# Patient Record
Sex: FEMALE | Race: BLACK OR AFRICAN AMERICAN | NOT HISPANIC OR LATINO | Employment: UNEMPLOYED | URBAN - METROPOLITAN AREA
[De-identification: names, ages, dates, MRNs, and addresses within clinical notes are randomized per-mention and may not be internally consistent; named-entity substitution may affect disease eponyms.]

---

## 2022-01-12 ENCOUNTER — PATIENT MESSAGE (OUTPATIENT)
Dept: ADMINISTRATIVE | Facility: OTHER | Age: 47
End: 2022-01-12

## 2022-01-13 ENCOUNTER — PATIENT MESSAGE (OUTPATIENT)
Dept: ADMINISTRATIVE | Facility: OTHER | Age: 47
End: 2022-01-13

## 2022-01-14 ENCOUNTER — PATIENT MESSAGE (OUTPATIENT)
Dept: ADMINISTRATIVE | Facility: OTHER | Age: 47
End: 2022-01-14

## 2023-03-08 PROBLEM — J43.9 PULMONARY EMPHYSEMA: Status: ACTIVE | Noted: 2023-03-08

## 2024-01-17 ENCOUNTER — ON-DEMAND VIRTUAL (OUTPATIENT)
Dept: URGENT CARE | Facility: CLINIC | Age: 49
End: 2024-01-17
Payer: COMMERCIAL

## 2024-01-17 DIAGNOSIS — B96.89 BACTERIAL UPPER RESPIRATORY INFECTION: Primary | ICD-10-CM

## 2024-01-17 DIAGNOSIS — J06.9 BACTERIAL UPPER RESPIRATORY INFECTION: Primary | ICD-10-CM

## 2024-01-17 PROCEDURE — 99203 OFFICE O/P NEW LOW 30 MIN: CPT | Mod: 95,,,

## 2024-01-17 RX ORDER — METHYLPREDNISOLONE 4 MG/1
TABLET ORAL
Qty: 21 EACH | Refills: 0 | Status: SHIPPED | OUTPATIENT
Start: 2024-01-17 | End: 2024-02-07

## 2024-01-17 RX ORDER — AZITHROMYCIN 250 MG/1
TABLET, FILM COATED ORAL
Qty: 6 TABLET | Refills: 0 | Status: SHIPPED | OUTPATIENT
Start: 2024-01-17 | End: 2024-01-22

## 2024-01-17 NOTE — PROGRESS NOTES
Subjective:      Patient ID: Madhuri Trejo is a 48 y.o. female.    Vitals:  vitals were not taken for this visit.     Chief Complaint: Sinus Problem      Visit Type: TELE AUDIOVISUAL    Present with the patient at the time of consultation: TELEMED PRESENT WITH PATIENT: None    Past Medical History:   Diagnosis Date    Anxiety disorder, unspecified     Emphysema, unspecified     G6PD deficiency     Migraines     Narcolepsy     PCOS (polycystic ovarian syndrome)     Sickle cell trait      Past Surgical History:   Procedure Laterality Date    THERMAL ABLATION OF ENDOMETRIUM      TONSILLECTOMY      TUBAL LIGATION       Review of patient's allergies indicates:   Allergen Reactions    Latex, natural rubber Blisters, Hives, Itching and Swelling    Aspirin     Codeine     Penicillins     Sulfa (sulfonamide antibiotics)      Current Outpatient Medications on File Prior to Visit   Medication Sig Dispense Refill    albuterol (ACCUNEB) 1.25 mg/3 mL Nebu Take 3 mLs (1.25 mg total) by nebulization every 6 (six) hours as needed (wheezing or SOB). Rescue 75 mL 12    albuterol (PROVENTIL HFA) 90 mcg/actuation inhaler Inhale 2 puffs into the lungs every 6 (six) hours as needed for Wheezing. Rescue 18 g 12    albuterol (PROVENTIL HFA) 90 mcg/actuation inhaler Inhale 2 puffs into the lungs every 6 (six) hours as needed for Wheezing. Rescue 18 g 12    baclofen (LIORESAL) 10 MG tablet Take by mouth.      benzonatate (TESSALON) 100 MG capsule Take 100 mg by mouth.      chlorhexidine (PERIDEX) 0.12 % solution SMARTSIG:Milliliter(s) By Mouth      DIALYVITE VITAMIN D3 MAX 1,250 mcg (50,000 unit) Tab Take 1 tablet by mouth every 30 days.      ergocalciferol (ERGOCALCIFEROL) 50,000 unit Cap       fluticasone propionate (FLOVENT HFA) 220 mcg/actuation inhaler Inhale 1 puff into the lungs 2 (two) times daily. Controller 12 g 12    hyoscyamine (LEVBID) 0.375 mg Tb12 Take 0.375 mg by mouth every 12 (twelve) hours.      ibuprofen (ADVIL,MOTRIN) 600  MG tablet Take 600 mg by mouth every 6 (six) hours.      levocetirizine (XYZAL) 5 MG tablet Take 5 mg by mouth.      levocetirizine (XYZAL) 5 MG tablet Take 1 tablet (5 mg total) by mouth every evening. 30 tablet 11    meloxicam (MOBIC) 7.5 MG tablet Take 7.5 mg by mouth 2 (two) times daily.      montelukast (SINGULAIR) 10 mg tablet Take by mouth.      propranoloL (INDERAL) 10 MG tablet Take 10 mg by mouth.      SAVELLA 50 mg Tab Take 50 mg by mouth 2 (two) times daily.      tiotropium-olodateroL (STIOLTO RESPIMAT) 2.5-2.5 mcg/actuation Mist Inhale 2 puffs into the lungs once daily. Controller 2.5 g 12    tiZANidine (ZANAFLEX) 4 MG tablet TAKE 1 TO 2 TABLETS BY MOUTH EVERY 8 HOURS AS NEEDED . DO NOT EXCEED 3 DOSES PER 24 HOURS.      topiramate (TROKENDI XR) 100 mg Cp24 Take 100 mg by mouth 2 (two) times a day.      TRULANCE 3 mg Tab Take 1 tablet by mouth.      zolpidem (AMBIEN) 5 MG Tab Take by mouth.       No current facility-administered medications on file prior to visit.     Family History   Problem Relation Age of Onset    Hypertension Mother     Heart disease Mother     Thalassemia Mother     Heart disease Father     Hypertension Father     Diabetes Father     Cancer Father     Sickle cell trait Father     Glucose-6-phos deficiency Father     Emphysema Father     Kidney disease Father     Arthritis Father        Medications Ordered                [facility] [address] [place] [address] [address]   [address] [facility] [address]    Telephone: [phone]   Fax: [phone]   Hours: Not open 24 hours                         E-Prescribed (2 of 2)              azithromycin (Z-MELLY) 250 MG tablet    Sig: Take 2 tablets by mouth on day 1; Take 1 tablet by mouth on days 2-5       Start: 1/17/24     Quantity: 6 tablet Refills: 0                         methylPREDNISolone (MEDROL DOSEPACK) 4 mg tablet    Sig: use as directed       Start: 1/17/24     Quantity: 21 each Refills: 0                            Ohs Peq Odvv Intake    1/17/2024  2:39 PM CST - Filed by Patient   What is your current physical address in the event of a medical emergency? [address] [facility] [address]   Are you able to take your vital signs? No   Please attach any relevant images or files          Patient states that about 2 days ago she began to have nasal congestion, ear pressure, sneezing and nasal drainage. Patient states that she is not having any other symptoms patient states that she took a covid test and it was negative. Patient states that her nasal drainage is yellow/green in color. Patient denies any chest pain or sob at this time     Sinus Problem  Associated symptoms include congestion and sneezing.       Constitution: Negative.   HENT:  Positive for congestion and postnasal drip.    Neck: neck negative.   Cardiovascular: Negative.    Eyes: Negative.    Respiratory: Negative.     Gastrointestinal: Negative.    Endocrine: negative.   Genitourinary: Negative.    Musculoskeletal: Negative.    Skin: Negative.    Allergic/Immunologic: Positive for sneezing.   Neurological: Negative.    Hematologic/Lymphatic: Negative.    Psychiatric/Behavioral: Negative.          Objective:   The physical exam was conducted virtually.  Physical Exam   Constitutional: She is oriented to person, place, and time.   HENT:   Head: Normocephalic and atraumatic.   Nose: Congestion present.   Eyes: Conjunctivae are normal. Pupils are equal, round, and reactive to light. Extraocular movement intact   Neck: Neck supple.   Pulmonary/Chest: Effort normal.   Abdominal: Normal appearance.   Musculoskeletal: Normal range of motion.         General: Normal range of motion.   Neurological: no focal deficit. She is alert, oriented to person, place, and time and at baseline.   Skin: Skin is warm.   Psychiatric: Her behavior is normal. Mood, judgment and thought content normal.       Assessment:     1. Bacterial upper respiratory infection        Plan:        Bacterial upper respiratory infection  -     azithromycin (Z-MELLY) 250 MG tablet; Take 2 tablets by mouth on day 1; Take 1 tablet by mouth on days 2-5  Dispense: 6 tablet; Refill: 0  -     methylPREDNISolone (MEDROL DOSEPACK) 4 mg tablet; use as directed  Dispense: 21 each; Refill: 0

## 2024-04-15 PROBLEM — J45.50 SEVERE PERSISTENT ASTHMA: Status: ACTIVE | Noted: 2024-04-15

## 2024-04-22 ENCOUNTER — PATIENT MESSAGE (OUTPATIENT)
Dept: ADMINISTRATIVE | Facility: OTHER | Age: 49
End: 2024-04-22
Payer: COMMERCIAL

## 2024-11-24 ENCOUNTER — PATIENT MESSAGE (OUTPATIENT)
Dept: ADMINISTRATIVE | Facility: OTHER | Age: 49
End: 2024-11-24
Payer: MEDICAID

## 2025-01-13 ENCOUNTER — PATIENT MESSAGE (OUTPATIENT)
Dept: ADMINISTRATIVE | Facility: OTHER | Age: 50
End: 2025-01-13
Payer: MEDICAID

## 2025-03-06 ENCOUNTER — PATIENT MESSAGE (OUTPATIENT)
Dept: ADMINISTRATIVE | Facility: OTHER | Age: 50
End: 2025-03-06
Payer: MEDICAID

## 2025-04-13 ENCOUNTER — ON-DEMAND VIRTUAL (OUTPATIENT)
Dept: URGENT CARE | Facility: CLINIC | Age: 50
End: 2025-04-13
Payer: MEDICAID

## 2025-04-13 DIAGNOSIS — J01.90 ACUTE SINUSITIS, RECURRENCE NOT SPECIFIED, UNSPECIFIED LOCATION: Primary | ICD-10-CM

## 2025-04-13 DIAGNOSIS — H92.03 OTALGIA OF BOTH EARS: ICD-10-CM

## 2025-04-13 PROCEDURE — 98005 SYNCH AUDIO-VIDEO EST LOW 20: CPT | Mod: 95,,, | Performed by: NURSE PRACTITIONER

## 2025-04-13 RX ORDER — AZITHROMYCIN 250 MG/1
TABLET, FILM COATED ORAL
Qty: 6 TABLET | Refills: 0 | Status: SHIPPED | OUTPATIENT
Start: 2025-04-13 | End: 2025-04-18

## 2025-04-13 NOTE — PATIENT INSTRUCTIONS

## 2025-04-13 NOTE — PROGRESS NOTES
Subjective:      Patient ID: Madhuri Trejo is a 49 y.o. female.    Vitals:  vitals were not taken for this visit.     Chief Complaint: Sinus Problem and Otalgia      Visit Type: TELE AUDIOVISUAL    Patient Location: Home [facility] [place]     Present with the patient at the time of consultation: TELEMED PRESENT WITH PATIENT: None    Past Medical History:   Diagnosis Date    Anxiety disorder, unspecified     Emphysema, unspecified     G6PD deficiency     Migraines     Narcolepsy     PCOS (polycystic ovarian syndrome)     Pneumonia, unspecified organism     Sickle cell trait     Sinus infection      Past Surgical History:   Procedure Laterality Date    THERMAL ABLATION OF ENDOMETRIUM      TONSILLECTOMY      TUBAL LIGATION       Review of patient's allergies indicates:   Allergen Reactions    Latex, natural rubber Blisters, Hives, Itching and Swelling    Aspirin     Codeine     Penicillins     Sulfa (sulfonamide antibiotics)     Trelegy ellipta [fluticasone-umeclidin-vilanter] Other (See Comments)     Fungus caused in mouth     Medications Ordered Prior to Encounter[1]  Family History   Problem Relation Name Age of Onset    Hypertension Mother      Heart disease Mother      Thalassemia Mother      Heart disease Father      Hypertension Father      Diabetes Father      Cancer Father      Sickle cell trait Father      Glucose-6-phos deficiency Father      Emphysema Father      Kidney disease Father      Arthritis Father         Medications Ordered                [id number] [address] AT [facility] & [address], [place] [address]    Telephone: [phone]   Fax: [phone]   Hours: Not open 24 hours                         E-Prescribed (1 of 1)              azithromycin (Z-MELLY) 250 MG tablet    Sig: Take 2 tablets by mouth on day 1; Take 1 tablet by mouth on days 2-5       Start: 4/13/25     Quantity: 6 tablet Refills: 0                           Ohs  Peq Odvv Intake    4/13/2025 12:28 PM CDT - Filed by Patient   What is your current physical address in the event of a medical emergency? [address] [facility] [address]   Are you able to take your vital signs? No   Please attach any relevant images or files    Is your employer contracted with [facility]? No         Pt w/ c/o bilateral ear pain, sinus pressure/pain, ha, fever of 103.2 last night.   Chart review noted visit on yesterday, today with worsening symptoms.  Denies CP, SOB, dizziness.     Sinus Problem  This is a new problem. The current episode started 1 to 4 weeks ago. The maximum temperature recorded prior to her arrival was 103 - 104 F. Associated symptoms include chills, congestion, coughing, ear pain, headaches, sinus pressure and a sore throat. Pertinent negatives include no hoarse voice or shortness of breath.   Otalgia   Associated symptoms include coughing, headaches and a sore throat.       Constitution: Positive for chills and fever.   HENT:  Positive for ear pain, congestion, sinus pain, sinus pressure and sore throat.    Cardiovascular:  Negative for chest pain.   Respiratory:  Positive for cough. Negative for shortness of breath and wheezing.    Neurological:  Positive for headaches. Negative for dizziness.        Objective:   The physical exam was conducted virtually.  Physical Exam   Constitutional: She is oriented to person, place, and time. No distress.   HENT:   Head: Normocephalic.   Ears:   Right Ear: External ear normal.   Left Ear: External ear normal.   Nose: No rhinorrhea or congestion.   Eyes: Conjunctivae are normal.   Neck: Neck supple.   Pulmonary/Chest: Effort normal. No respiratory distress.   Neurological: She is alert and oriented to person, place, and time.   Skin: Skin is no rash.       Assessment:     1. Acute sinusitis, recurrence not specified, unspecified location    2. Otalgia of both ears        Plan:   Increase fluids and rest  Pedialyte,  powerade, gatorade  Take meds as directed  Warm salt water gargles, tea with honey, chlorseptic spray, throat lozenges   Limit foods that are salty, spicy food, limit carbonated drinks, limit acidic drinks  Tylenol or Motrin as directed for fever or body aches  Continue antihistamine (zyrtec or Claritin), Flonase and saline nasal spray  Okay to take Robitussin DM, Mucinex DM, Dayquil/Nyquil as directed  If increased Shortness of breath or difficulty breathing go to the Urgent Care or ER  If symptoms worsening or not improved f/u in Urgent Care or with PCP    Acute sinusitis, recurrence not specified, unspecified location  -     azithromycin (Z-MELLY) 250 MG tablet; Take 2 tablets by mouth on day 1; Take 1 tablet by mouth on days 2-5  Dispense: 6 tablet; Refill: 0    Otalgia of both ears  -     azithromycin (Z-MELLY) 250 MG tablet; Take 2 tablets by mouth on day 1; Take 1 tablet by mouth on days 2-5  Dispense: 6 tablet; Refill: 0      We appreciate you trusting us with your medical care. We hope you feel better soon. We will be happy to take care of you for all of your future medical needs.     You must understand that you've received Virtual treatment only and that you may be released before all your medical problems are known or treated. You, the patient, will arrange for follow up care as instructed.     Follow up with your PCP or specialty clinic as directed in the next 1-2 weeks if not improved or as needed. You can call [phone] to schedule an appointment with the appropriate provider.     If your condition worsens we recommend that you receive another evaluation in person, with your primary care provider, urgent care or at the emergency room immediately or contact your primary medical clinics after hours call service to discuss your concerns.                   [1]   Current Outpatient Medications on File Prior to Visit   Medication Sig Dispense Refill    albuterol (PROVENTIL HFA) 90 mcg/actuation inhaler  Inhale 2 puffs into the lungs every 6 (six) hours as needed for Wheezing. Rescue      albuterol-budesonide (AIRSUPRA) 90-80 mcg/actuation Inhale 2 puffs into the lungs every 6 (six) hours as needed. (Patient not taking: Reported on 4/12/2025) 10.7 g 11    amLODIPine (NORVASC) 5 MG tablet Take 5 mg by mouth.      azelastine (ASTELIN) 137 mcg (0.1 %) nasal spray 1 spray (137 mcg total) by Nasal route 2 (two) times daily. 30 mL 6    benralizumab (FASENRA PEN) 30 mg/mL AtIn Inject 30 mg into the skin every 8 weeks. for 12 doses 1 mL 11    butalbitaL-acetaminop-caf-cod -22-30 mg Cap Take 1 capsule by mouth every 4 (four) hours as needed.      butalbital-acetaminophen-caffeine -40 mg (FIORICET, ESGIC) -40 mg per tablet Take 1-2 tablets by mouth every 4 (four) hours as needed. (Patient not taking: Reported on 4/12/2025)      chlorhexidine (PERIDEX) 0.12 % solution SMARTSIG:Milliliter(s) By Mouth (Patient not taking: Reported on 4/12/2025)      cholecalciferol, vitamin D3, 1,250 mcg (50,000 unit) capsule Take by mouth.      diclofenac sodium (SOLARAZE) 3 % gel Apply 1 application  topically 2 (two) times daily.      EPINEPHrine (EPIPEN) 0.3 mg/0.3 mL AtIn Inject 0.3 mLs (0.3 mg total) into the muscle once. for 1 dose 0.3 mL 0    ergocalciferol (ERGOCALCIFEROL) 50,000 unit Cap       fluticasone propionate (FLONASE) 50 mcg/actuation nasal spray 2 sprays (100 mcg total) by Each Nostril route once daily. 18 g 11    fluticasone propionate (FLOVENT HFA) 220 mcg/actuation inhaler Inhale 1 puff into the lungs 2 (two) times daily. Controller 12 g 12    gabapentin (NEURONTIN) 100 MG capsule Take 100 mg by mouth. (Patient not taking: Reported on 4/12/2025)      hydrOXYzine HCL (ATARAX) 25 MG tablet Take 25 mg by mouth 3 (three) times daily.      hyoscyamine (LEVBID) 0.375 mg Tb12 Take 0.375 mg by mouth every 12 (twelve) hours.      ibuprofen (ADVIL,MOTRIN) 600 MG tablet Take 600 mg by mouth every 6 (six) hours.       indomethacin (INDOCIN) 50 MG capsule Take by mouth.      ipratropium (ATROVENT) 21 mcg (0.03 %) nasal spray SMARTSI Spray(s) Both Nares 2-3 Times Daily      levocetirizine (XYZAL) 5 MG tablet Take 1 tablet (5 mg total) by mouth every evening. 30 tablet 11    meloxicam (MOBIC) 7.5 MG tablet Take 7.5 mg by mouth 2 (two) times daily.      montelukast (SINGULAIR) 10 mg tablet Take 1 tablet (10 mg total) by mouth every evening. 30 tablet 11    naratriptan (AMERGE) 2.5 MG tablet Take by mouth.      NUVIGIL 250 mg tablet       phenylephrine-DM-guaiFENesin (DECONEX DMX) 10-17.5-385 mg Tab Take 1 tablet by mouth every 4 to 6 hours as needed (congestion). (Patient not taking: Reported on 2025) 30 tablet 1    promethazine (PHENERGAN) 25 MG tablet Take 25 mg by mouth every 6 (six) hours as needed.      propranoloL (INDERAL) 10 MG tablet Take 10 mg by mouth.      rosuvastatin (CRESTOR) 10 MG tablet Take 10 mg by mouth every evening.      SAVELLA 50 mg Tab Take 50 mg by mouth 2 (two) times daily.      tiotropium-olodateroL (STIOLTO RESPIMAT) 2.5-2.5 mcg/actuation Mist Inhale 2 puffs into the lungs once daily. Controller 2.5 g 12    tiZANidine (ZANAFLEX) 4 MG tablet TAKE 1 TO 2 TABLETS BY MOUTH EVERY 8 HOURS AS NEEDED . DO NOT EXCEED 3 DOSES PER 24 HOURS.      topiramate (TROKENDI XR) 100 mg Cp24 Take 100 mg by mouth 2 (two) times a day.      TRULANCE 3 mg Tab Take 1 tablet by mouth.      zolpidem (AMBIEN) 10 mg Tab Take 5 mg by mouth.       No current facility-administered medications on file prior to visit.

## 2025-06-27 ENCOUNTER — PATIENT MESSAGE (OUTPATIENT)
Dept: ADMINISTRATIVE | Facility: OTHER | Age: 50
End: 2025-06-27
Payer: MEDICAID

## 2025-08-20 ENCOUNTER — PATIENT MESSAGE (OUTPATIENT)
Dept: ADMINISTRATIVE | Facility: OTHER | Age: 50
End: 2025-08-20
Payer: MEDICAID